# Patient Record
Sex: FEMALE | HISPANIC OR LATINO | Employment: FULL TIME | URBAN - METROPOLITAN AREA
[De-identification: names, ages, dates, MRNs, and addresses within clinical notes are randomized per-mention and may not be internally consistent; named-entity substitution may affect disease eponyms.]

---

## 2024-08-30 ENCOUNTER — OFFICE VISIT (OUTPATIENT)
Dept: OBSTETRICS AND GYNECOLOGY | Facility: CLINIC | Age: 39
End: 2024-08-30
Payer: OTHER GOVERNMENT

## 2024-08-30 VITALS
HEIGHT: 65 IN | WEIGHT: 182 LBS | DIASTOLIC BLOOD PRESSURE: 80 MMHG | SYSTOLIC BLOOD PRESSURE: 124 MMHG | BODY MASS INDEX: 30.32 KG/M2

## 2024-08-30 DIAGNOSIS — Z13.89 SCREENING FOR GENITOURINARY CONDITION: Primary | ICD-10-CM

## 2024-08-30 DIAGNOSIS — R10.2 PELVIC PAIN: ICD-10-CM

## 2024-08-30 LAB
B-HCG UR QL: NEGATIVE
BILIRUB BLD-MCNC: NEGATIVE MG/DL
CLARITY, POC: CLEAR
COLOR UR: YELLOW
EXPIRATION DATE: NORMAL
GLUCOSE UR STRIP-MCNC: NEGATIVE MG/DL
INTERNAL NEGATIVE CONTROL: NORMAL
INTERNAL POSITIVE CONTROL: NORMAL
KETONES UR QL: NEGATIVE
LEUKOCYTE EST, POC: NEGATIVE
Lab: NORMAL
NITRITE UR-MCNC: NEGATIVE MG/ML
PH UR: 5 [PH] (ref 5–8)
PROT UR STRIP-MCNC: NEGATIVE MG/DL
RBC # UR STRIP: NEGATIVE /UL
SP GR UR: 1 (ref 1–1.03)
UROBILINOGEN UR QL: NORMAL

## 2024-08-30 RX ORDER — NORETHINDRONE ACETATE 5 MG
TABLET ORAL
COMMUNITY
Start: 2024-07-16

## 2024-08-30 RX ORDER — LEVOTHYROXINE SODIUM 50 UG/1
50 TABLET ORAL DAILY
COMMUNITY

## 2024-08-30 RX ORDER — VALACYCLOVIR HYDROCHLORIDE 500 MG/1
TABLET, FILM COATED ORAL
COMMUNITY
Start: 2024-07-16

## 2024-08-30 RX ORDER — LISDEXAMFETAMINE DIMESYLATE 50 MG/1
CAPSULE ORAL
COMMUNITY
Start: 2024-08-16

## 2024-08-30 RX ORDER — PROPRANOLOL HYDROCHLORIDE 10 MG/1
TABLET ORAL
COMMUNITY
Start: 2024-06-11

## 2024-08-30 NOTE — PROGRESS NOTES
"Subjective     Chief Complaint   Patient presents with    Pelvic Pain       Daiana Carrizales is a 39 y.o. No obstetric history on file. whose LMP is Patient's last menstrual period was 08/22/2024.. She is new to the area. 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     She has Mirena IUD. This IUD was placed 11/22 and this is her 3rd IUD. She is concerned this IUD may be different than the Mirena because of conversation she and the prior MD had plus her experience with this one is so much different than the two before. Since this IUD was placed she has cramping that is severe. She reports the pain is so severe that she has been to the ER x 2. Reports the pain feels like it is stabbing. She has had a CT scan and US- reports normal in [place]. The pain occurs the week before, the week of, and the week after her period. Reports the pain is sporadic. Ibuprofen helps. Today she mild pain on her (R) side. She has stopped taking the norenthindrone she was prescribed bc it was not hleping with her pain.         HPI    HPI    The following portions of the patient's history were reviewed and updated as appropriate:vital signs, allergies, current medications, past medical history, past social history, past surgical history, and problem list      Review of Systems     Review of Systems   Constitutional: Negative.    Genitourinary:  Positive for pelvic pain.   Musculoskeletal:  Positive for back pain.       Objective      /80   Ht 165.1 cm (65\")   Wt 82.6 kg (182 lb)   LMP 08/22/2024   Breastfeeding No   BMI 30.29 kg/m²     Physical Exam    Physical Exam  Vitals and nursing note reviewed.   Constitutional:       Appearance: Normal appearance.   Abdominal:      General: There is no distension.      Palpations: Abdomen is soft. There is no mass.      Tenderness: There is abdominal tenderness.   Skin:     General: Skin is warm and dry.   Neurological:      General: No focal deficit " present.      Mental Status: She is alert and oriented to person, place, and time.   Psychiatric:         Mood and Affect: Mood normal.         Behavior: Behavior normal.         Lab Review   Labs: Urine pregnancy test, Urinalysis - with micro     Imaging   No data reviewed    Assessment  Diagnoses and all orders for this visit:    1. Screening for genitourinary condition (Primary)  -     POC Urinalysis Dipstick  -     POC Pregnancy, Urine    2. Pelvic pain        Assessment/Plan:   Pelvic pain- Need records from previous provider. Unable to see IUD strings on exam today. Pt thinks she has a Mirena but has some concerns it could be a Kylena. Plan to replace Mirena. If her pain continues despite IUD replacement, may need further imaging or dx lap.   IUD strings lost- Unable to locate IUD strings. Check TVUS. Rec removal and replacement of IUD under US visualization to ensure correct location.     RTO FOR TVUS and IUD replacement.     Rosita Torres, APRN  9/4/2024

## 2024-09-04 ENCOUNTER — TELEPHONE (OUTPATIENT)
Dept: OBSTETRICS AND GYNECOLOGY | Facility: CLINIC | Age: 39
End: 2024-09-04
Payer: OTHER GOVERNMENT

## 2024-09-04 NOTE — TELEPHONE ENCOUNTER
Check benefits of Mirena. Pt currently has IUD, thinks Mirena but is having issues with. She desires a new IUD to be placed.

## 2024-09-13 ENCOUNTER — TELEPHONE (OUTPATIENT)
Dept: OBSTETRICS AND GYNECOLOGY | Facility: CLINIC | Age: 39
End: 2024-09-13

## 2024-09-13 NOTE — TELEPHONE ENCOUNTER
Called Pt, we do not have benefits approved for Mirena at this time 09/13/24.     Pt was scheduled today for IUD replacement under us guided.     Appt had to be cancelled.     We need to call pt Monday and get the patient R/S for appt + Benefits Verified.     It is Ok to speak to the  and schedule. Pt can not have her phone during work hours.

## 2024-09-20 ENCOUNTER — OFFICE VISIT (OUTPATIENT)
Dept: OBSTETRICS AND GYNECOLOGY | Facility: CLINIC | Age: 39
End: 2024-09-20
Payer: OTHER GOVERNMENT

## 2024-09-20 VITALS
DIASTOLIC BLOOD PRESSURE: 72 MMHG | SYSTOLIC BLOOD PRESSURE: 114 MMHG | BODY MASS INDEX: 30.16 KG/M2 | HEIGHT: 65 IN | WEIGHT: 181 LBS

## 2024-09-20 DIAGNOSIS — R82.90 ABNORMAL URINALYSIS: ICD-10-CM

## 2024-09-20 DIAGNOSIS — Z13.89 SCREENING FOR GENITOURINARY CONDITION: ICD-10-CM

## 2024-09-20 DIAGNOSIS — Z30.430 ENCOUNTER FOR IUD INSERTION: Primary | ICD-10-CM

## 2024-09-20 LAB
B-HCG UR QL: NEGATIVE
BILIRUB BLD-MCNC: NEGATIVE MG/DL
CLARITY, POC: ABNORMAL
COLOR UR: ABNORMAL
EXPIRATION DATE: NORMAL
GLUCOSE UR STRIP-MCNC: NEGATIVE MG/DL
INTERNAL NEGATIVE CONTROL: NORMAL
INTERNAL POSITIVE CONTROL: NORMAL
KETONES UR QL: NEGATIVE
LEUKOCYTE EST, POC: ABNORMAL
Lab: NORMAL
NITRITE UR-MCNC: POSITIVE MG/ML
PH UR: 7 [PH] (ref 5–8)
PROT UR STRIP-MCNC: ABNORMAL MG/DL
RBC # UR STRIP: ABNORMAL /UL
SP GR UR: 1.02 (ref 1–1.03)
UROBILINOGEN UR QL: NORMAL

## 2024-09-20 RX ORDER — NITROFURANTOIN 25; 75 MG/1; MG/1
100 CAPSULE ORAL 2 TIMES DAILY
Qty: 14 CAPSULE | Refills: 0 | Status: SHIPPED | OUTPATIENT
Start: 2024-09-20 | End: 2024-09-27

## 2024-09-20 NOTE — PROGRESS NOTES
S: Pt here for IUD removal and reinsertion.     Vitals:    09/20/24 1220   BP: 114/72     O: Review of Systems - Negative except pelvic pain at times    A: Physical Exam  Vitals and nursing note reviewed.   Constitutional:       Appearance: Normal appearance.   Genitourinary:     Labia:         Right: No rash, tenderness or lesion.         Left: No rash, tenderness or lesion.       Vagina: No signs of injury and foreign body. No vaginal discharge, erythema, tenderness or bleeding.      Cervix: Normal.      Uterus: Normal.       Adnexa: Right adnexa normal and left adnexa normal.   Musculoskeletal:         General: Normal range of motion.   Skin:     General: Skin is warm and dry.   Neurological:      General: No focal deficit present.      Mental Status: She is alert and oriented to person, place, and time.   Psychiatric:         Mood and Affect: Mood normal.         Behavior: Behavior normal.               Type of IUD:  Mirena  Date of insertion:  Uncertain  Reason for removal:  Pelvic pain   Other relevant history/information:  none    Procedure Time Out Documentation      Procedure Details  IUD strings visible:  no  Local anesthesia:  None  Tenaculum used:  Yes, single tooth  Removal:   Alligator forceps attempted x 3. Dr. Glynn came into exam room and used IUD hook with permission of the patient    All appropriate instructions regarding removal were reviewed.    Tolerated well  No apparent complications  Post procedure diagnosis : IUD removal     Plans for contraception:  IUD    Other follow-up needed:  none    The patient was advised to call for any fever or for prolonged or severe pain or bleeding. She was advised to use NSAID as needed for mild to moderate pain.     Procedure: Intrauterine device insertion     LNMP: now   Pregnancy Test: neg  Current Contraception Method: IUD      Chief Complaint: IUD insertion.  Patient presents for IUD insertion.  She appears stable today and agrees to proceed with IUD  placement.      Pre procedure indication 1) Desires Mirena  Post procedure indication 1) Desires Mirena    The risks, benefits, and alternatives to IUD were explained at length with the patient. All her questions were answered and consents were signed.    The patient was placed in a dorsal lithotomy position on the examining table in [facility]. A bimanual exam confirmed the uterus was normal in size, AV.  A warmed metal speculum was inserted into the vagina and the cervix was brought into view.    The cervix was prepped with Betadine. A tenaculum was used to grasp the cervix. The endometrial cavity was then sounded to 9 cm without use of a dilator.    The  was then carefully advanced to the cervical canal into the uterus to the level of the fundus. This was then backed off about 1.5-2 cm to allow sufficient space for the arms to open. The device was deployed. The  was removed carefully from the uterus. The threads were then cut leaving 2-3 cm visible outside of the cervix.   Good hemostasis was noted.     All other instruments were removed from the vagina. There were no complications.  The patient tolerated the procedure well with a minimal amount of discomfort.    The patient was counseled about the need to return in 4 weeks for string check.     She was counseled about the need to use a backup method of contraception such as condoms for 1-2 weeks. The patient is counseled to contact us if she has any significant or increasing bleeding, pain, fever, chills, or other concerns. She is instructed to see a doctor right away if she believes that she may be pregnant at any time with the IUD in place.      Plan: 1) Pelvic pain- IUD removal and insertion. Will try the new IUD to see if this resolves any c/o.   2) UTI- Noted on UA today. Check urine cx. ERX macrobid    RTO PRN and 4 weeks IUD check     PEGGY De La Garza  9/20/2024  13:05 EDT

## 2024-09-25 LAB
BACTERIA UR CULT: ABNORMAL
BACTERIA UR CULT: ABNORMAL
OTHER ANTIBIOTIC SUSC ISLT: ABNORMAL

## 2024-10-01 PROBLEM — R82.90 ABNORMAL URINALYSIS: Status: ACTIVE | Noted: 2024-10-01

## 2024-10-25 ENCOUNTER — OFFICE VISIT (OUTPATIENT)
Dept: OBSTETRICS AND GYNECOLOGY | Facility: CLINIC | Age: 39
End: 2024-10-25
Payer: OTHER GOVERNMENT

## 2024-10-25 VITALS
DIASTOLIC BLOOD PRESSURE: 70 MMHG | HEIGHT: 65 IN | WEIGHT: 183 LBS | BODY MASS INDEX: 30.49 KG/M2 | SYSTOLIC BLOOD PRESSURE: 110 MMHG

## 2024-10-25 DIAGNOSIS — Z13.89 SCREENING FOR GENITOURINARY CONDITION: ICD-10-CM

## 2024-10-25 DIAGNOSIS — Z30.431 IUD CHECK UP: Primary | ICD-10-CM

## 2024-10-25 DIAGNOSIS — Z76.89 ENCOUNTER TO ESTABLISH CARE: ICD-10-CM

## 2024-10-25 RX ORDER — PROPRANOLOL HYDROCHLORIDE 10 MG/1
20 TABLET ORAL NIGHTLY
Qty: 60 TABLET | Refills: 3 | Status: SHIPPED | OUTPATIENT
Start: 2024-10-25

## 2024-10-25 RX ORDER — LEVOTHYROXINE SODIUM 50 UG/1
50 TABLET ORAL DAILY
Qty: 30 TABLET | Refills: 3 | Status: SHIPPED | OUTPATIENT
Start: 2024-10-25

## 2024-10-25 NOTE — PROGRESS NOTES
S: Patient present today for her IUD string check. She has the Mirena IUD and it was placed on 10/1/24. She reports only cramping since placement. The sharp pain she was experiencing prior to this IUD being placed has resolved. She has not tried to feel her IUD strings. She is happy with the IUD.  She is asking for a refill of her propranolol and levothyroxine until she is established with a PCP since just moving here     O: UPT: neg   Physical Exam  Vitals and nursing note reviewed.   Constitutional:       Appearance: Normal appearance.   Genitourinary:     Labia:         Right: No rash, tenderness or lesion.         Left: No rash, tenderness or lesion.       Vagina: No signs of injury and foreign body. No vaginal discharge, erythema, tenderness or bleeding.      Cervix: No cervical motion tenderness, discharge, friability, lesion, erythema or cervical bleeding.      Comments: IUD strings noted, extremely short in cervical os   Musculoskeletal:         General: Normal range of motion.   Skin:     General: Skin is warm and dry.   Neurological:      General: No focal deficit present.      Mental Status: She is alert and oriented to person, place, and time.   Psychiatric:         Mood and Affect: Mood normal.         Behavior: Behavior normal.         A: IUD check   Med refills    P: 1) Rev IUD string checks and when to perform. Instructed to call office with any concerns.   2) Med refills- Ref placed for PCP. Refills sent on medications. She understands this is a courtesy for her and will need to be managed by PCP once that care has been established.     RTO PRN and for NOEL Torres, PEGGY  10/25/2024  12:08 EDT

## 2024-10-29 PROBLEM — Z76.89 ENCOUNTER TO ESTABLISH CARE: Status: ACTIVE | Noted: 2024-10-29

## 2024-10-29 PROBLEM — Z30.431 IUD CHECK UP: Status: ACTIVE | Noted: 2024-10-29

## 2024-11-08 ENCOUNTER — OFFICE VISIT (OUTPATIENT)
Dept: FAMILY MEDICINE CLINIC | Facility: CLINIC | Age: 39
End: 2024-11-08
Payer: OTHER GOVERNMENT

## 2024-11-08 VITALS
SYSTOLIC BLOOD PRESSURE: 122 MMHG | OXYGEN SATURATION: 98 % | HEART RATE: 69 BPM | HEIGHT: 65 IN | DIASTOLIC BLOOD PRESSURE: 70 MMHG | TEMPERATURE: 96.7 F | RESPIRATION RATE: 14 BRPM | WEIGHT: 188.5 LBS | BODY MASS INDEX: 31.4 KG/M2

## 2024-11-08 DIAGNOSIS — E06.3 HYPOTHYROIDISM DUE TO HASHIMOTO THYROIDITIS: Primary | ICD-10-CM

## 2024-11-08 DIAGNOSIS — R00.2 PALPITATIONS: ICD-10-CM

## 2024-11-08 DIAGNOSIS — Z76.89 ENCOUNTER TO ESTABLISH CARE: ICD-10-CM

## 2024-11-08 DIAGNOSIS — F90.9 ATTENTION DEFICIT HYPERACTIVITY DISORDER (ADHD), UNSPECIFIED ADHD TYPE: ICD-10-CM

## 2024-11-08 RX ORDER — LISDEXAMFETAMINE DIMESYLATE 50 MG/1
50 CAPSULE ORAL EVERY MORNING
Qty: 90 CAPSULE | Refills: 0 | Status: SHIPPED | OUTPATIENT
Start: 2024-11-08

## 2024-11-08 NOTE — PROGRESS NOTES
"Chief Complaint  Establish Care (Discuss cardiology referall had zio patch in past ), Hashimoto's Thyroiditis, Palpitations, and ADHD    Subjective        Daiana Carrizales presents to [facility]  History of Present Illness    39F  with a PMH of hypothyroidism, ADHD, palpitations,     She is established with OBGYN. She reports mammogram completed within the last year.           Objective   Vital Signs:  /70   Pulse 69   Temp 96.7 °F (35.9 °C) (Temporal)   Resp 14   Ht 165.1 cm (65\")   Wt 85.5 kg (188 lb 8 oz)   SpO2 98%   BMI 31.37 kg/m²   Estimated body mass index is 31.37 kg/m² as calculated from the following:    Height as of this encounter: 165.1 cm (65\").    Weight as of this encounter: 85.5 kg (188 lb 8 oz).          Physical Exam   Result Review :                Assessment and Plan   Diagnoses and all orders for this visit:    1. Hypothyroidism due to Hashimoto thyroiditis (Primary)    2. Attention deficit hyperactivity disorder (ADHD), unspecified ADHD type    3. Palpitations    4. Encounter to establish care    39-year-old female here to establish care history of ADHD and Hashimoto thyroidism, we will obtain blood work before next physical, she has a history of palpitations in which we will obtain a baseline EKG to evaluate for QTc prolongation however she reports most of her symptoms are largely at night           Follow Up   No follow-ups on file.  Patient was given instructions and counseling regarding her condition or for health maintenance advice. Please see specific information pulled into the AVS if appropriate.             "

## 2024-11-09 LAB
ALBUMIN SERPL-MCNC: 4.3 G/DL (ref 3.9–4.9)
ALBUMIN/CREAT UR: 5 MG/G CREAT (ref 0–29)
ALP SERPL-CCNC: 70 IU/L (ref 44–121)
ALT SERPL-CCNC: 8 IU/L (ref 0–32)
AST SERPL-CCNC: 13 IU/L (ref 0–40)
BASOPHILS # BLD AUTO: 0 X10E3/UL (ref 0–0.2)
BASOPHILS NFR BLD AUTO: 1 %
BILIRUB SERPL-MCNC: 0.5 MG/DL (ref 0–1.2)
BUN SERPL-MCNC: 11 MG/DL (ref 6–24)
BUN/CREAT SERPL: 14 (ref 9–23)
CALCIUM SERPL-MCNC: 9 MG/DL (ref 8.7–10.2)
CHLORIDE SERPL-SCNC: 104 MMOL/L (ref 96–106)
CHOLEST SERPL-MCNC: 245 MG/DL (ref 100–199)
CO2 SERPL-SCNC: 23 MMOL/L (ref 20–29)
CREAT SERPL-MCNC: 0.78 MG/DL (ref 0.57–1)
CREAT UR-MCNC: 70.6 MG/DL
EGFRCR SERPLBLD CKD-EPI 2021: 98 ML/MIN/1.73
EOSINOPHIL # BLD AUTO: 0.1 X10E3/UL (ref 0–0.4)
EOSINOPHIL NFR BLD AUTO: 2 %
ERYTHROCYTE [DISTWIDTH] IN BLOOD BY AUTOMATED COUNT: 11.8 % (ref 11.7–15.4)
GLOBULIN SER CALC-MCNC: 2.6 G/DL (ref 1.5–4.5)
GLUCOSE SERPL-MCNC: 91 MG/DL (ref 70–99)
HBA1C MFR BLD: 5.2 % (ref 4.8–5.6)
HCT VFR BLD AUTO: 44.2 % (ref 34–46.6)
HDLC SERPL-MCNC: 59 MG/DL
HGB BLD-MCNC: 14.7 G/DL (ref 11.1–15.9)
IMM GRANULOCYTES # BLD AUTO: 0 X10E3/UL (ref 0–0.1)
IMM GRANULOCYTES NFR BLD AUTO: 0 %
LDLC SERPL CALC-MCNC: 166 MG/DL (ref 0–99)
LYMPHOCYTES # BLD AUTO: 1.7 X10E3/UL (ref 0.7–3.1)
LYMPHOCYTES NFR BLD AUTO: 27 %
MCH RBC QN AUTO: 31.2 PG (ref 26.6–33)
MCHC RBC AUTO-ENTMCNC: 33.3 G/DL (ref 31.5–35.7)
MCV RBC AUTO: 94 FL (ref 79–97)
MICROALBUMIN UR-MCNC: 3.5 UG/ML
MONOCYTES # BLD AUTO: 0.4 X10E3/UL (ref 0.1–0.9)
MONOCYTES NFR BLD AUTO: 7 %
NEUTROPHILS # BLD AUTO: 3.9 X10E3/UL (ref 1.4–7)
NEUTROPHILS NFR BLD AUTO: 63 %
PLATELET # BLD AUTO: 312 X10E3/UL (ref 150–450)
POTASSIUM SERPL-SCNC: 5.1 MMOL/L (ref 3.5–5.2)
PROT SERPL-MCNC: 6.9 G/DL (ref 6–8.5)
RBC # BLD AUTO: 4.71 X10E6/UL (ref 3.77–5.28)
SODIUM SERPL-SCNC: 137 MMOL/L (ref 134–144)
TRIGL SERPL-MCNC: 114 MG/DL (ref 0–149)
VLDLC SERPL CALC-MCNC: 20 MG/DL (ref 5–40)
WBC # BLD AUTO: 6.2 X10E3/UL (ref 3.4–10.8)

## 2024-11-23 RX ORDER — LEVOTHYROXINE SODIUM 50 UG/1
50 TABLET ORAL DAILY
Qty: 30 TABLET | Refills: 3 | Status: CANCELLED | OUTPATIENT
Start: 2024-11-23

## 2024-11-25 DIAGNOSIS — F90.9 ATTENTION DEFICIT HYPERACTIVITY DISORDER (ADHD), UNSPECIFIED ADHD TYPE: Primary | ICD-10-CM

## 2024-11-25 DIAGNOSIS — Z79.899 HIGH RISK MEDICATIONS (NOT ANTICOAGULANTS) LONG-TERM USE: ICD-10-CM

## 2024-11-25 DIAGNOSIS — F90.9 ATTENTION DEFICIT HYPERACTIVITY DISORDER (ADHD), UNSPECIFIED ADHD TYPE: ICD-10-CM

## 2024-11-25 RX ORDER — LEVOTHYROXINE SODIUM 50 UG/1
50 TABLET ORAL DAILY
Qty: 30 TABLET | Refills: 3 | Status: SHIPPED | OUTPATIENT
Start: 2024-11-25

## 2024-11-25 RX ORDER — LISDEXAMFETAMINE DIMESYLATE 50 MG/1
50 CAPSULE ORAL EVERY MORNING
Qty: 90 CAPSULE | Refills: 0 | Status: CANCELLED | OUTPATIENT
Start: 2024-11-25

## 2024-12-03 DIAGNOSIS — F90.9 ATTENTION DEFICIT HYPERACTIVITY DISORDER (ADHD), UNSPECIFIED ADHD TYPE: ICD-10-CM

## 2024-12-04 ENCOUNTER — DOCUMENTATION (OUTPATIENT)
Dept: FAMILY MEDICINE CLINIC | Facility: CLINIC | Age: 39
End: 2024-12-04
Payer: OTHER GOVERNMENT

## 2024-12-04 LAB
1OH-MIDAZOLAM UR QL SCN: NOT DETECTED NG/MG CREAT
6MAM UR QL SCN: NEGATIVE NG/ML
7AMINOCLONAZEPAM/CREAT UR: NOT DETECTED NG/MG CREAT
A-OH ALPRAZ/CREAT UR: NOT DETECTED NG/MG CREAT
A-OH-TRIAZOLAM/CREAT UR CFM: NOT DETECTED NG/MG CREAT
ALPRAZ/CREAT UR CFM: NOT DETECTED NG/MG CREAT
AMPHETAMINES UR QL SCN: NEGATIVE NG/ML
BARBITURATES UR QL SCN: NEGATIVE NG/ML
BENZODIAZ SCN METH UR: NEGATIVE
BUPRENORPHINE UR QL SCN: NEGATIVE
BUPRENORPHINE/CREAT UR: NOT DETECTED NG/MG CREAT
CLONAZEPAM/CREAT UR CFM: NOT DETECTED NG/MG CREAT
COCAINE+BZE UR QL SCN: NEGATIVE NG/ML
CREAT UR-MCNC: 43 MG/DL
DESALKYLFLURAZ/CREAT UR: NOT DETECTED NG/MG CREAT
DIAZEPAM/CREAT UR: NOT DETECTED NG/MG CREAT
FENTANYL CTO UR SCN-MCNC: NEGATIVE NG/ML
FENTANYL/CREAT UR: NOT DETECTED NG/MG CREAT
FLUNITRAZEPAM UR QL SCN: NOT DETECTED NG/MG CREAT
LORAZEPAM/CREAT UR: NOT DETECTED NG/MG CREAT
METHADONE UR QL SCN: NEGATIVE NG/ML
METHADONE+METAB UR QL SCN: NEGATIVE NG/ML
MIDAZOLAM/CREAT UR CFM: NOT DETECTED NG/MG CREAT
NORBUPRENORPHINE/CREAT UR: NOT DETECTED NG/MG CREAT
NORDIAZEPAM/CREAT UR: NOT DETECTED NG/MG CREAT
NORFENTANYL/CREAT UR: NOT DETECTED NG/MG CREAT
NORFLUNITRAZEPAM UR-MCNC: NOT DETECTED NG/MG CREAT
OPIATES UR SCN-MCNC: NEGATIVE NG/ML
OXAZEPAM/CREAT UR: NOT DETECTED NG/MG CREAT
OXYCODONE CTO UR SCN-MCNC: NEGATIVE NG/ML
PCP UR QL SCN: NEGATIVE NG/ML
PRESCRIBED MEDICATIONS: NORMAL
TAPENTADOL CTO UR SCN-MCNC: NEGATIVE NG/ML
TEMAZEPAM/CREAT UR: NOT DETECTED NG/MG CREAT
TRAMADOL UR QL SCN: NEGATIVE NG/ML

## 2024-12-04 RX ORDER — LISDEXAMFETAMINE DIMESYLATE 50 MG/1
50 CAPSULE ORAL EVERY MORNING
Qty: 30 CAPSULE | Refills: 0 | Status: SHIPPED | OUTPATIENT
Start: 2024-12-04

## 2024-12-06 ENCOUNTER — OFFICE VISIT (OUTPATIENT)
Dept: FAMILY MEDICINE CLINIC | Facility: CLINIC | Age: 39
End: 2024-12-06
Payer: OTHER GOVERNMENT

## 2024-12-06 VITALS
BODY MASS INDEX: 33.05 KG/M2 | HEART RATE: 74 BPM | WEIGHT: 198.4 LBS | TEMPERATURE: 96.7 F | HEIGHT: 65 IN | DIASTOLIC BLOOD PRESSURE: 80 MMHG | OXYGEN SATURATION: 96 % | SYSTOLIC BLOOD PRESSURE: 118 MMHG | RESPIRATION RATE: 16 BRPM

## 2024-12-06 DIAGNOSIS — E06.3 HYPOTHYROIDISM DUE TO HASHIMOTO THYROIDITIS: Primary | ICD-10-CM

## 2024-12-06 DIAGNOSIS — Z80.3 FAMILY HISTORY OF BREAST CANCER: ICD-10-CM

## 2024-12-06 DIAGNOSIS — J02.9 SORE THROAT: ICD-10-CM

## 2024-12-06 DIAGNOSIS — Z71.3 WEIGHT LOSS COUNSELING, ENCOUNTER FOR: ICD-10-CM

## 2024-12-06 DIAGNOSIS — E78.00 HYPERCHOLESTEROLEMIA: ICD-10-CM

## 2024-12-06 LAB
EXPIRATION DATE: NORMAL
FLUAV AG NPH QL: NEGATIVE
FLUBV AG NPH QL: NEGATIVE
INTERNAL CONTROL: NORMAL
Lab: NORMAL
S PYO AG THROAT QL: NEGATIVE
SARS-COV-2 AG UPPER RESP QL IA.RAPID: NOT DETECTED

## 2024-12-06 PROCEDURE — 87880 STREP A ASSAY W/OPTIC: CPT

## 2024-12-06 PROCEDURE — 87426 SARSCOV CORONAVIRUS AG IA: CPT

## 2024-12-06 PROCEDURE — 99396 PREV VISIT EST AGE 40-64: CPT

## 2024-12-06 PROCEDURE — 87804 INFLUENZA ASSAY W/OPTIC: CPT

## 2024-12-06 RX ORDER — ROSUVASTATIN CALCIUM 10 MG/1
10 TABLET, COATED ORAL DAILY
Qty: 90 TABLET | Refills: 2 | Status: SHIPPED | OUTPATIENT
Start: 2024-12-06

## 2024-12-06 NOTE — PROGRESS NOTES
Chief Complaint  Annual checkup    HISTORY    Daiana Carrizales is a 39 y.o. female who presents to the office today as a  a new patient for their annual preventative exam.     39F  with a PMH of hypothyroidism, ADHD, palpitations, pure hypercholesterolemia with a notably elevated LDL at 166.  She has a Mirena IUD.     She is established with OBGYN. She reports mammogram completed within the last year.    She additionally reports with sore throat, cough, headache, malaise, fever, which ahs been improving.     No hospitalization(s) within the last year.     GYN History:     *[facility]     *regular periods every 29days    *Mirena    *Previous pregnancies: 2.  Live births: 2.  Miscarriages: 0 . Elective abortions: 0.      Health Maintenance Summary            Overdue - BMI FOLLOWUP (Yearly) Never done      No completion history exists for this topic.              Overdue - MAMMOGRAM (Every 2 Years) Never done      No completion history exists for this topic.              Overdue - ANNUAL PHYSICAL (Yearly) Never done      No completion history exists for this topic.              Overdue - PAP SMEAR (Every 3 Years) Never done      No completion history exists for this topic.              Overdue - COVID-19 Vaccine ( - - season) Never done      No completion history exists for this topic.              Postponed - TDAP/TD VACCINES (1 - Tdap) Postponed until 2033      No completion history exists for this topic.              INFLUENZA VACCINE  Completed      09/10/2024  Imm Admin: Fluzone  >6mos              HEPATITIS C SCREENING  Completed      2024  Done              Pneumococcal Vaccine 0-64 (Series Information) Aged Out      No completion history exists for this topic.                     No Known Allergies     No outpatient medications have been marked as taking for the 24 encounter (Appointment) with Pancho Rodriguez DO.        Past Medical History:    Diagnosis Date    ADHD (attention deficit hyperactivity disorder)     Hashimoto's thyroiditis     Herpes genitalis in women     Palpitations      Past Surgical History:   Procedure Laterality Date    VAGINAL DELIVERY      x2     Family History   Problem Relation Age of Onset    Mental illness Mother     Adrenal disorder Mother     Depression Sister     Diabetes Maternal Grandmother     reports that she has never smoked. She has never been exposed to tobacco smoke. She has never used smokeless tobacco. She reports current alcohol use. She reports that she does not use drugs.    Immunization History   Administered Date(s) Administered    Fluzone  >6mos 09/10/2024        OBJECTIVE    Vital Signs:   There were no vitals taken for this visit.    Physical Exam  Constitutional:       Appearance: Normal appearance. She is not ill-appearing, toxic-appearing or diaphoretic.   HENT:      Head: Normocephalic. No raccoon eyes, contusion, masses or laceration.      Nose: Nose normal.   Eyes:      General: No scleral icterus.        Right eye: No discharge.         Left eye: No discharge.      Extraocular Movements: Extraocular movements intact.      Pupils: Pupils are equal, round, and reactive to light.   Neck:      Thyroid: No thyromegaly.      Vascular: No JVD.   Cardiovascular:      Rate and Rhythm: Normal rate and regular rhythm.      Pulses: Normal pulses.   Pulmonary:      Effort: No accessory muscle usage or respiratory distress.      Breath sounds: Normal breath sounds. No stridor. No wheezing, rhonchi or rales.   Chest:      Chest wall: No tenderness.   Abdominal:      General: There is no distension.      Palpations: Abdomen is soft. There is no fluid wave or pulsatile mass.      Tenderness: There is no abdominal tenderness. There is no guarding.   Musculoskeletal:         General: No swelling, tenderness or deformity.      Cervical back: Neck supple. No rigidity.   Skin:     General: Skin is warm and dry.       Coloration: Skin is not jaundiced.   Neurological:      General: No focal deficit present.      Mental Status: She is alert and oriented to person, place, and time.   Psychiatric:         Mood and Affect: Mood normal.         Thought Content: Thought content normal.         Judgment: Judgment normal.                       The 10-year ASCVD risk score (Orlin PIERRE, et al., 2019) is: 0.7%    Values used to calculate the score:      Age: 39 years      Sex: Female      Is Non- : No      Diabetic: No      Tobacco smoker: No      Systolic Blood Pressure: 122 mmHg      Is BP treated: No      HDL Cholesterol: 59 mg/dL      Total Cholesterol: 245 mg/dL     ASSESSMENT & PLAN     #Annual Preventative Health Examination   -Age and sex appropriate physical exam performed and documented. Updated past medical, family, social and surgical histories as well as allergies and care team list. Addressed care gaps listed in the medical record.  -Encouraged annual dental and vision exams as part of their overall health.  -Encouraged minimum of 30 minutes or more of exercise at a brisk walk or higher 5 days per week combined with a well-balanced diet.  -Advised that all women who are planning or capable of pregnancy take a daily supplement containing 0.4 to 0.8 mg (400 to 800 ?g) of folic acid.  -Immunizations reviewed and updated in EMR. COVID19 recommended.  -Lipid screening:   Lipid Panel          11/8/2024    09:36   Lipid Panel   Total Cholesterol 245    Triglycerides 114    HDL Cholesterol 59    VLDL Cholesterol 20    LDL Cholesterol  166     Patient is over age 40 and a 10-year ASCVD risk was calculated which does indicate a need for statin therapy. See plan below. .   -Aspirin for primary or secondary prevention: Not applicable, patient is less than age 50.  -Depression and Anxiety screening: Patient denies symptom of anxiety or depression.  -Diabetes screening:  Screening not indicated at this time.    -Tobacco use screening: Conducted and addressed if indicated.   -Alcohol use screening: Conducted and addressed if indicated.   -Illicit drug screening: Conducted and addressed if indicated.   -Hypertension screening: Patient screened negative for HTN today.  -HIV screening: completed   -Syphilis screening: Syphilis screening not indicated.  -Hepatitis B virus screening: Screening not indicated, not in a high-risk group.  -Hepatitis C virus screening:  Patient has already completed Hepatitis C screening. Negative screening on file.   -Colon cancer screening: Patient is less than age 45 and colon cancer screening is not indicated.  -Lung cancer screening: Patient has never smoked.  -Cervical cancer screening:    -Breast cancer screening:  She reports her last mammogram was normal at outside facility, will attempt to obtain records.  -BRCA related cancer screening:  Patient does have a known personal or family history.   -Osteoporosis screening: Informed patient that the USPSTF recommends screening for osteoporosis with bone measurement testing to prevent osteoporotic fractures in women 65 years and older. Screening is not applicable at this time.    Recommend Covid immunization.     Follow up in 1 year for annual physical exam.    Patient/family had no further questions at this time and verbalized understanding of the plan discussed today.   Flu and COVID were obtained at this visit were both negative  Because of her family breast cancer history and her aunt we will order breast cancer blood screening.  We discussed her hyperlipidemia and will start Crestor at 10 mg daily.  Repeat lipid panel with apolipoprotein B and lipoprotein a prior to next visit she would also like to start Zepbound for weight management which was prescribed at this visit  Avoid stimulant weight loss medications given her history of palpitations.   -Patient seen today for initiation of pharmacological weight loss therapy. Patient advised of  the alternatives to pharmacological weight loss therapy, including strict dieting and exercise, weight loss programs such as Weightwatchers, and mobile applications such as [employer]. Advised patient that weight loss medication is most successful when in conjunction with diet and exercise.  -After discussing the alternatives, patient is interested in starting Zepbound.  -Reviewed contraindications for Zepbound: Patients with a personal or family history or MTC or patients with MEN 2 or pregnancy  -Discussed the most common side effects , including nausea, diarrhea, constipation, vomiting, injection site reactions, headache, indigestion, fatigue, belching, hair loss, heartburn, minor abdominal pain   -Advised patient to report severe abdominal pain as Zepbound can cause pancreatitis or gallbladder disease.   -Counseled patient regarding the potential risk of MTC with use of Zepbound and informed them of symptoms of thyroid tumors (eg, a mass in the neck, dysphagia, dyspnea, persistent hoarseness). Report these if present.   -Counseled patient on strategies to limit nausea with Zepbound:    *Eat bland, low-fat foods, like crackers, toast, and rice   *Eat foods that contain water, like soups and gelatin   *Avoid lying down after they eat  -Educated patient on proper injection location.   -Will begin Zebound titration. Patient to increase each week as long as side effects are tolerable.   Week 1 through week 4 : 2.5 mg once weekly.  Week 5 through week 8: 5 mg once weekly.  Week 9 through week 12: 7.5 mg once weekly.  Week 13 through week 16: 10 mg once weekly.  Week 17 through week 20 : 12.5 mg once weekly  Week 21 and after: 15 mg once weekly   -Will follow up in 3-4 months for weight check. Plan to discontinue if at least 5% of baseline body weight loss has not been achieved at that time.     Pancho Rodriguez, DO

## 2024-12-13 DIAGNOSIS — F90.2 ATTENTION DEFICIT HYPERACTIVITY DISORDER (ADHD), COMBINED TYPE: Primary | ICD-10-CM

## 2024-12-13 RX ORDER — LISDEXAMFETAMINE DIMESYLATE 50 MG/1
50 CAPSULE ORAL EVERY MORNING
Qty: 30 CAPSULE | Refills: 0 | Status: SHIPPED | OUTPATIENT
Start: 2024-12-13

## 2024-12-27 ENCOUNTER — HOSPITAL ENCOUNTER (EMERGENCY)
Facility: HOSPITAL | Age: 39
Discharge: HOME | End: 2024-12-28
Attending: STUDENT IN AN ORGANIZED HEALTH CARE EDUCATION/TRAINING PROGRAM
Payer: OTHER GOVERNMENT

## 2024-12-27 VITALS
SYSTOLIC BLOOD PRESSURE: 142 MMHG | OXYGEN SATURATION: 100 % | TEMPERATURE: 97.4 F | DIASTOLIC BLOOD PRESSURE: 86 MMHG | HEART RATE: 80 BPM | RESPIRATION RATE: 16 BRPM

## 2024-12-27 DIAGNOSIS — R00.2 PALPITATIONS: Primary | ICD-10-CM

## 2024-12-27 DIAGNOSIS — I49.3 PVC (PREMATURE VENTRICULAR CONTRACTION): ICD-10-CM

## 2024-12-27 LAB
ALBUMIN SERPL-MCNC: 4.2 G/DL (ref 3.5–5.2)
ALBUMIN/GLOB SERPL: 1.4 G/DL
ALP SERPL-CCNC: 78 U/L (ref 39–117)
ALT SERPL W P-5'-P-CCNC: 11 U/L (ref 1–33)
ANION GAP SERPL CALCULATED.3IONS-SCNC: 9.2 MMOL/L (ref 5–15)
AST SERPL-CCNC: 11 U/L (ref 1–32)
BASOPHILS # BLD AUTO: 0.02 10*3/MM3 (ref 0–0.2)
BASOPHILS NFR BLD AUTO: 0.2 % (ref 0–1.5)
BILIRUB SERPL-MCNC: 0.2 MG/DL (ref 0–1.2)
BUN SERPL-MCNC: 18 MG/DL (ref 6–20)
BUN/CREAT SERPL: 25 (ref 7–25)
CALCIUM SPEC-SCNC: 9.3 MG/DL (ref 8.6–10.5)
CHLORIDE SERPL-SCNC: 99 MMOL/L (ref 98–107)
CO2 SERPL-SCNC: 24.8 MMOL/L (ref 22–29)
CREAT SERPL-MCNC: 0.72 MG/DL (ref 0.57–1)
DEPRECATED RDW RBC AUTO: 43 FL (ref 37–54)
EGFRCR SERPLBLD CKD-EPI 2021: 107.9 ML/MIN/1.73
EOSINOPHIL # BLD AUTO: 0.18 10*3/MM3 (ref 0–0.4)
EOSINOPHIL NFR BLD AUTO: 2 % (ref 0.3–6.2)
ERYTHROCYTE [DISTWIDTH] IN BLOOD BY AUTOMATED COUNT: 12.6 % (ref 12.3–15.4)
GLOBULIN UR ELPH-MCNC: 3.1 GM/DL
GLUCOSE SERPL-MCNC: 93 MG/DL (ref 65–99)
HCG SERPL QL: NEGATIVE
HCT VFR BLD AUTO: 41.9 % (ref 34–46.6)
HGB BLD-MCNC: 13.8 G/DL (ref 12–15.9)
HOLD SPECIMEN: NORMAL
IMM GRANULOCYTES # BLD AUTO: 0.02 10*3/MM3 (ref 0–0.05)
IMM GRANULOCYTES NFR BLD AUTO: 0.2 % (ref 0–0.5)
LYMPHOCYTES # BLD AUTO: 3.14 10*3/MM3 (ref 0.7–3.1)
LYMPHOCYTES NFR BLD AUTO: 35.6 % (ref 19.6–45.3)
MCH RBC QN AUTO: 30.3 PG (ref 26.6–33)
MCHC RBC AUTO-ENTMCNC: 32.9 G/DL (ref 31.5–35.7)
MCV RBC AUTO: 91.9 FL (ref 79–97)
MONOCYTES # BLD AUTO: 0.81 10*3/MM3 (ref 0.1–0.9)
MONOCYTES NFR BLD AUTO: 9.2 % (ref 5–12)
NEUTROPHILS NFR BLD AUTO: 4.65 10*3/MM3 (ref 1.7–7)
NEUTROPHILS NFR BLD AUTO: 52.8 % (ref 42.7–76)
PLATELET # BLD AUTO: 362 10*3/MM3 (ref 140–450)
PMV BLD AUTO: 9.6 FL (ref 6–12)
POTASSIUM SERPL-SCNC: 3.7 MMOL/L (ref 3.5–5.2)
PROT SERPL-MCNC: 7.3 G/DL (ref 6–8.5)
RBC # BLD AUTO: 4.56 10*6/MM3 (ref 3.77–5.28)
SODIUM SERPL-SCNC: 133 MMOL/L (ref 136–145)
TROPONIN T SERPL HS-MCNC: <6 NG/L
WBC NRBC COR # BLD AUTO: 8.82 10*3/MM3 (ref 3.4–10.8)
WHOLE BLOOD HOLD SPECIMEN: NORMAL

## 2024-12-27 PROCEDURE — 99283 EMERGENCY DEPT VISIT LOW MDM: CPT

## 2024-12-27 PROCEDURE — 84484 ASSAY OF TROPONIN QUANT: CPT | Performed by: STUDENT IN AN ORGANIZED HEALTH CARE EDUCATION/TRAINING PROGRAM

## 2024-12-27 PROCEDURE — 85025 COMPLETE CBC W/AUTO DIFF WBC: CPT | Performed by: STUDENT IN AN ORGANIZED HEALTH CARE EDUCATION/TRAINING PROGRAM

## 2024-12-27 PROCEDURE — 80053 COMPREHEN METABOLIC PANEL: CPT | Performed by: STUDENT IN AN ORGANIZED HEALTH CARE EDUCATION/TRAINING PROGRAM

## 2024-12-27 PROCEDURE — 93005 ELECTROCARDIOGRAM TRACING: CPT | Performed by: STUDENT IN AN ORGANIZED HEALTH CARE EDUCATION/TRAINING PROGRAM

## 2024-12-27 PROCEDURE — 84703 CHORIONIC GONADOTROPIN ASSAY: CPT | Performed by: STUDENT IN AN ORGANIZED HEALTH CARE EDUCATION/TRAINING PROGRAM

## 2024-12-27 PROCEDURE — 25810000003 SODIUM CHLORIDE 0.9 % SOLUTION: Performed by: STUDENT IN AN ORGANIZED HEALTH CARE EDUCATION/TRAINING PROGRAM

## 2024-12-27 RX ORDER — SODIUM CHLORIDE 0.9 % (FLUSH) 0.9 %
10 SYRINGE (ML) INJECTION AS NEEDED
Status: DISCONTINUED | OUTPATIENT
Start: 2024-12-27 | End: 2024-12-28 | Stop reason: HOSPADM

## 2024-12-27 RX ADMIN — SODIUM CHLORIDE 1000 ML: 9 INJECTION, SOLUTION INTRAVENOUS at 23:07

## 2024-12-28 LAB
GEN 5 1HR TROPONIN T REFLEX: <6 NG/L
QT INTERVAL: 379 MS
QTC INTERVAL: 443 MS
TROPONIN T NUMERIC DELTA: NORMAL

## 2024-12-28 PROCEDURE — 84484 ASSAY OF TROPONIN QUANT: CPT | Performed by: STUDENT IN AN ORGANIZED HEALTH CARE EDUCATION/TRAINING PROGRAM

## 2024-12-28 PROCEDURE — 36415 COLL VENOUS BLD VENIPUNCTURE: CPT

## 2024-12-28 NOTE — FS[facility] PROVIDER NOTE
Subjective   History of Present Illness see MDM     Review of Systems   Constitutional:  Negative for fever.   Respiratory:  Negative for shortness of breath.    Cardiovascular:  Positive for palpitations. Negative for chest pain.   Gastrointestinal:  Negative for abdominal pain, nausea and vomiting.   Genitourinary:  Negative for dysuria.   Skin:  Negative for rash and wound.   Neurological:  Negative for weakness and numbness.       Past Medical History:   Diagnosis Date    ADHD (attention deficit hyperactivity disorder)     Hashimoto's thyroiditis     Herpes genitalis in women     Migraines 01/01/2000    Palpitations        No Known Allergies    Past Surgical History:   Procedure Laterality Date    VAGINAL DELIVERY      x2       Family History   Problem Relation Age of Onset    Mental illness Mother     Adrenal disorder Mother     Depression Sister     Diabetes Maternal Grandmother        Social History     Socioeconomic History    Marital status:    Tobacco Use    Smoking status: Never     Passive exposure: Never    Smokeless tobacco: Never   Vaping Use    Vaping status: Never Used   Substance and Sexual Activity    Alcohol use: Yes     Comment: rare    Drug use: Never    Sexual activity: Yes     Partners: Male           Objective   Physical Exam  Vitals and nursing note reviewed.   Constitutional:       Appearance: Normal appearance.   HENT:      Head: Atraumatic.      Right Ear: External ear normal.      Left Ear: External ear normal.      Nose: Nose normal.      Mouth/Throat:      Pharynx: Oropharynx is clear.   Eyes:      Conjunctiva/sclera: Conjunctivae normal.   Cardiovascular:      Rate and Rhythm: Normal rate.      Pulses: Normal pulses.      Heart sounds: Normal heart sounds.   Pulmonary:      Effort: Pulmonary effort is normal. No respiratory distress.      Breath sounds: Normal breath sounds.      Comments: She has normal heart sound lung sounds.  No respiratory distress.  No crackles or  wheezing.  Abdominal:      General: Abdomen is flat. There is no distension.      Palpations: Abdomen is soft.      Tenderness: There is no abdominal tenderness. There is no right CVA tenderness, left CVA tenderness, guarding or rebound.   Musculoskeletal:         General: No swelling.      Cervical back: Neck supple.      Right lower leg: No edema.      Left lower leg: No edema.   Skin:     General: Skin is warm and dry.   Neurological:      Mental Status: She is alert and oriented to person, place, and time. Mental status is at baseline.   Psychiatric:         Behavior: Behavior normal.         Procedures           ED Course  ED Course as of 12/28/24 0617   Fri Dec 27, 2024   2113 ECG 12 Lead Chest Pain  I reviewed the EKG myself.  The patient has sinus rhythm.  Heart rate is 82.  LA interval 136.  QTc 443.  Occasional PVCs.  Otherwise, there is no abnormal ST elevation, depressions or T wave inversion. [DL]   2328 HCG Qualitative: Negative  Patient is not pregnant [DL]   2328 CBC & Differential(!)  I reviewed the CBC.  White blood cell count is normal at 8.8.  Hemoglobin is 13.8 and platelet is 362 [DL]   Sat Dec 28, 2024   0014 HS Troponin T: <6  Less likely to be ACS or cardiac demand or myocarditis. [DL]   0014 Comprehensive Metabolic Panel(!)  CMP was reviewed by me.  Patient with glucose of 93, creatinine 0.7.  Sodium 133, potassium 3.7, bicarb of 25, calcium 9.3, ALT 11, AST 11, total bilirubin 0.2. [DL]      ED Course User Index  [DL] Lopez Naranjo MD                                           Medical Decision Making  This is a 39-year-old female patient, history of thyroid disease, ADHD on medications, who came to the emergency room for palpitations.  She said that this has happened to her before and did very.  In fact, she did have a Zio patch and she was told that she had  PVCs.  She has not follow-up with cardiology.  She moved to [facility] in June and has not follow-up with anyone.    She  states that she does not drink a lot of caffeine.  She does take ADHD medication but her symptoms started before that.  She denies any chest pain, shortness of breath.  She has no nausea, vomiting, abdominal pain, diarrhea.  No headache, focal weakness.    On physical exam, the patient is well-appearing.  She is under no acute distress.  Reading a book in the [place].  She has normal heart sound lung sounds.  She has normal radial pulses.  Abdomen is soft and nontender.  No guarding or distention.  No CVA tenderness.  She is awake and alert x 4, conversing appropriately.  Normal gait in the ED.    On telemetry, the patient has occasional PVCs.  Not a lot.    Her vital signs is reassuring with no tachycardia.  No tachypnea.  No hypotension or hypertension.  No fever.  Oxygen is 100% on room air.    Assessment: The patient with palpitations, EKG and telemetry called PVCs.  This is also consistent with her Zio patch reportedly with PVCs in [place].  She has no chest pain or shortness of breath.  EKG is nonischemic.  I did consider ACS and obtained troponins.  2 troponins are negative.  Her heart score is 0.    I did also consider dehydration, electrolyte abnormalities, LESLIE has obtained her labs and provided her IV fluid.    She also has history of thyroid disorder.  However, she is not tachycardic, she is not having proptosis of her eyes, no swelling.  I doubt hypothyroidism or thyroid storm.    She also has a negative PERC score so I doubt PE.    Workup: CBC, CMP, EKG, chest x-ray, troponin    Problems Addressed:  Palpitations: complicated acute illness or injury  PVC (premature ventricular contraction): complicated acute illness or injury    Amount and/or Complexity of Data Reviewed  Labs: ordered. Decision-making details documented in ED Course.  ECG/medicine tests: ordered. Decision-making details documented in ED Course.    Risk  Prescription drug management.    Updates: The patient with no abnormal findings on  objective workup.  I referred her to cardiology for evaluation of her palpitations.  She wanted a referral to general surgery because when she was in [place], she was told that she had gallstones.  She did not have any abdominal tenderness on my exam.  She did not come to the ED today for abdominal pain.  I did not obtain an ultrasound or workup for gallstones.  Her LFTs normal.  She has no fever.  I do not suspect that she has cholecystitis.  I referred her to general surgery as requested by the patient.  I did tell the patient about home care instructions and return precautions and she voices understanding and agreement to this plan.  The patient was discharged ambulatory home.    Please note that portions of this note were completed with a voice recognition program.         Final diagnoses:   Palpitations   PVC (premature ventricular contraction)       ED Disposition  ED Disposition       ED Disposition   Discharge    Condition   Stable    Comment   --               [place] DO  [address]  [facility] [address]  [phone]    In 1 day      [facility]  [address] [address]    If symptoms worsen    [facility]  [address]  [facility] [id number] [phone]  In 1 week      [facility] [place]  [address] [id number] [phone]  In 1 week           Medication List      No changes were made to your prescriptions during this visit.

## 2024-12-28 NOTE — DISCHARGE INSTRUCTIONS
Thank you for your visit to the Emergency Department.     It was a pleasure to take care of you in the emergency room today.     Today you were seen for palpitations. We performed a thorough history and physical exam.  We obtain your lab test, including EKG, troponin test for the heart.  There are no abnormal findings on the lab test.  However, we did see that you have some PVCs on the monitor and on EKG.  This is likely the reason why you were experiencing the palpitations.  We referred you to cardiology as requested as well as surgery as you requested for your gallbladder stones.      Please return to the nearest ED or call 911 if you experience:    Worsening symptoms, severe pain, difficulty breathing, chest pain, fever that doesn't break with Tylenol or Motrin, uncontrolled vomiting or diarrhea that doesn't stop, passing out, lethargy (drowsiness, hard to wake up), numbness/tingling/weakness on one side, speech difficulty, cannot walk, or any concerns for limb/life threatening issues.    The Emergency Department is open 24 hours a day.     Please follow up with: your primary care doctor within 1-3 days.    In the meantime, please:  Take acetaminophen 650mg every 6-8 hours and/or ibuprofen 600mg every 6-8 hours on a full stomach as needed for pain or fever.   Continue to take any other existing medications as prescribed.

## 2024-12-28 NOTE — ED NOTES
"PT CAME OUT OF HER ROOM AND STATED \"I'M LEAVING.\" DR. LR STATED THAT THEY NEEDED PAPERWORK AND TO WAIT JUST A MINUTE TO RECEIVE. HE ALSO INQUIRED ABOUT WHERE HER IV WENT. SHE STATED \"I USED TO PUT THEM IN SO I JUST TOOK IT OUT.\" THIS RN WENT TO PRINT D/C PAPERWORK AND HAND TO PT. PT DEPARTED WITH MALE  WITHOUT RECEIVING D/C PAPERWORK.   "

## 2025-01-21 DIAGNOSIS — F90.2 ATTENTION DEFICIT HYPERACTIVITY DISORDER (ADHD), COMBINED TYPE: ICD-10-CM

## 2025-01-22 RX ORDER — LISDEXAMFETAMINE DIMESYLATE 50 MG/1
50 CAPSULE ORAL EVERY MORNING
Qty: 30 CAPSULE | Refills: 0 | Status: SHIPPED | OUTPATIENT
Start: 2025-01-22

## 2025-01-23 RX ORDER — PROPRANOLOL HYDROCHLORIDE 10 MG/1
20 TABLET ORAL NIGHTLY
Qty: 60 TABLET | Refills: 3 | OUTPATIENT
Start: 2025-01-23

## 2025-01-24 ENCOUNTER — OFFICE VISIT (OUTPATIENT)
Dept: FAMILY MEDICINE CLINIC | Facility: CLINIC | Age: 40
End: 2025-01-24
Payer: OTHER GOVERNMENT

## 2025-01-24 VITALS
OXYGEN SATURATION: 98 % | TEMPERATURE: 96.8 F | DIASTOLIC BLOOD PRESSURE: 80 MMHG | RESPIRATION RATE: 16 BRPM | HEIGHT: 65 IN | BODY MASS INDEX: 34.05 KG/M2 | HEART RATE: 78 BPM | SYSTOLIC BLOOD PRESSURE: 110 MMHG | WEIGHT: 204.4 LBS

## 2025-01-24 DIAGNOSIS — E06.3 HYPOTHYROIDISM DUE TO HASHIMOTO THYROIDITIS: ICD-10-CM

## 2025-01-24 DIAGNOSIS — Z80.3 FAMILY HISTORY OF BREAST CANCER: ICD-10-CM

## 2025-01-24 DIAGNOSIS — Z71.3 WEIGHT LOSS COUNSELING, ENCOUNTER FOR: Primary | ICD-10-CM

## 2025-01-24 DIAGNOSIS — E66.01 MORBID (SEVERE) OBESITY DUE TO EXCESS CALORIES: ICD-10-CM

## 2025-01-24 DIAGNOSIS — F90.9 ATTENTION DEFICIT HYPERACTIVITY DISORDER (ADHD), UNSPECIFIED ADHD TYPE: ICD-10-CM

## 2025-01-24 DIAGNOSIS — E78.00 HYPERCHOLESTEROLEMIA: ICD-10-CM

## 2025-01-24 PROCEDURE — 99214 OFFICE O/P EST MOD 30 MIN: CPT

## 2025-01-24 RX ORDER — PROPRANOLOL HYDROCHLORIDE 10 MG/1
20 TABLET ORAL NIGHTLY
Qty: 60 TABLET | Refills: 3 | Status: SHIPPED | OUTPATIENT
Start: 2025-01-24

## 2025-01-24 NOTE — PROGRESS NOTES
Chief Complaint  Med Refill    Subjective          Daiana Carrizales presents to [facility]  History of Present Illness  The patient presents for weight management, palpitations, hypothyroidism, hypercholesterolemia, and health maintenance.    She has expressed a desire to increase her dosage of Zepbound, which she has been tolerating well. She reports a decrease in bowel movements from 3 to 4 times daily to once daily since starting the medication. She has also implemented lifestyle changes, including abstaining from alcohol consumption and adopting a 14-hour fasting regimen. Despite these efforts, she has observed weight gain. Her typical diet includes peanut butter crackers and coffee for breakfast, 2 slices of thin crust pizza for lunch, and a Caesar salad with chips for dinner. She has been taking B12 supplements to combat fatigue.    She has been experiencing palpitations, which were evaluated with an EKG and ultrasound as part of her disability claim with the [employer]. The technician identified extra ventricular beats, pending confirmation from the radiologist. These episodes are alarming for her. She continues to take propranolol nightly, which she finds beneficial. During daytime episodes, she manages with deep breathing exercises and relaxation techniques.    She is currently on a low dose of levothyroxine at 50 mcg.    She was started on rosuvastatin 10 mg in November 2023 for elevated cholesterol levels and has been taking it daily. She has a scheduled appointment on 03/07/2024 for fasting blood work and a follow-up visit on 03/14/2024.    She had lumps in her breast that were found last year around April 2023, which were benign. She is supposed to just watch it and have a breast exam over here. She is interested in testing if she has those BRCA genes.    SOCIAL HISTORY  She has cut out alcohol, previously consuming a beer a night.    FAMILY HISTORY  Her brother had  "non-Hodgkin's lymphoma on his neck. Her mother had a pituitary gland tumor. Her sister had depression and  as a cause of that. Her grandmother had diabetes. Her uncles have high blood pressure and high cholesterol. She has no family history of heart attack.    MEDICATIONS  Current: Vyvanse, propranolol, levothyroxine, rosuvastatin, B12      Objective   Vital Signs:   /80 (BP Location: Left arm, Patient Position: Sitting, Cuff Size: Adult)   Pulse 78   Temp 96.8 °F (36 °C) (Temporal)   Resp 16   Ht 165.1 cm (65\")   Wt 92.7 kg (204 lb 6.4 oz)   SpO2 98%   BMI 34.01 kg/m²     Physical Exam   Physical Exam  Heart sounds normal.    Result Review :     Prior notes available in the EMR were reviewed from the last 3months.   Prior labwork available within the EMR within the last year was reviewed          Results  Imaging  Ultrasound of the heart showed extra ventricular beats.             Assessment and Plan      Assessment & Plan  Attention deficit hyperactivity disorder (ADHD), unspecified ADHD type           Hypothyroidism due to Hashimoto thyroiditis    Orders:    TSH; Future    T4, free; Future    Weight loss counseling, encounter for    Orders:    Tirzepatide-Weight Management (ZEPBOUND) 5 MG/0.5ML solution auto-injector; Inject 0.5 mL under the skin into the appropriate area as directed 1 (One) Time Per Week.    Morbid (severe) obesity due to excess calories  Patient's (Body mass index is 34.01 kg/m².) indicates that they are obese (BMI >30) with health conditions that include dyslipidemias . Weight is worsening. BMI  is above average; BMI management plan is completed. We discussed portion control and increasing exercise.     Orders:    Tirzepatide-Weight Management (ZEPBOUND) 5 MG/0.5ML solution auto-injector; Inject 0.5 mL under the skin into the appropriate area as directed 1 (One) Time Per Week.    Hypercholesterolemia       Orders:    Lipid panel; Future    Family history of breast " cancer    Orders:    BRCAAssure Comprehensive Test     Answers submitted by the patient for this visit:  Weight Management (Submitted on 1/22/2025)  Chief Complaint: Weight Management  Weight: unchanged  Weight loss treatment: portion control, increasing exercise, decreasing alcohol consumption  Eating habit changes: Eating smaller portions  Energy level: decreased  Physical activity tolerance: stable  Treatment barriers: adherence to exercise, adherence to diet, ran out of medications  -Advised patient to report severe abdominal pain as Zepbound can cause pancreatitis or gallbladder disease.   -Counseled patient on strategies to limit nausea with Zepbound:    *Eat bland, low-fat foods, like crackers, toast, and rice   *Eat foods that contain water, like soups and gelatin   *Avoid lying down after they eat  -Educated patient on proper injection location.   -Will begin Zebound titration. Patient to increase each week as long as side effects are tolerable.   Increase to 5 mg once weekly.    -Will follow up in 3-4 months for weight check. Plan to discontinue if at least 5% of baseline body weight loss has not been achieved at that time.     Assessment & Plan  1. Weight management.  Her weight has increased, which may be attributed to hypothyroidism. She is currently on a low dose of levothyroxine. She is advised to incorporate more fruits and vegetables into her diet and consider taking a multivitamin supplement. The dosage of Zepbound will be increased. A TSH test will be ordered to ensure it is within the normal range.    2. Palpitations.  Her heart sounds are normal, and there is no immediate concern regarding the palpitations. She will continue her current regimen of propranolol, which will be refilled at [facility].    3. Hypothyroidism.  A TSH test will be ordered to ensure it is within the normal range. She is currently on a low dose of levothyroxine.    4. Hypercholesterolemia.  A lipid panel will be ordered to  monitor her cholesterol levels. She is currently taking rosuvastatin 10 mg daily.    5. Health maintenance.  A BRCA genetic test will be ordered due to her family history of breast cancer.      The above conditions are being actively managed, and applied to the decision making of this visit. Each was reviewed and are controlled, except for the changes noted above   The patient has multiple chronic illness as stated above  The prior available notes were reviewed   Lab results were reviewed  New orders as above, which may involve side effects related to management  Prescription drug management as above      Follow Up   No follow-ups on file.    Patient or patient representative verbalized consent for the use of Ambient Listening during the visit with  Pancho Rodriguez DO for chart documentation. 1/24/2025  08:49 EST    Patient was given instructions and counseling regarding her condition or for health maintenance advice. Please see specific information pulled into the AVS if appropriate.

## 2025-01-24 NOTE — ASSESSMENT & PLAN NOTE
Patient's (Body mass index is 34.01 kg/m².) indicates that they are obese (BMI >30) with health conditions that include dyslipidemias . Weight is worsening. BMI  is above average; BMI management plan is completed. We discussed portion control and increasing exercise.     Orders:    Tirzepatide-Weight Management (ZEPBOUND) 5 MG/0.5ML solution auto-injector; Inject 0.5 mL under the skin into the appropriate area as directed 1 (One) Time Per Week.

## 2025-01-30 DIAGNOSIS — F90.2 ATTENTION DEFICIT HYPERACTIVITY DISORDER (ADHD), COMBINED TYPE: ICD-10-CM

## 2025-01-30 RX ORDER — LISDEXAMFETAMINE DIMESYLATE 50 MG/1
50 CAPSULE ORAL EVERY MORNING
Qty: 30 CAPSULE | Refills: 0 | OUTPATIENT
Start: 2025-01-30

## 2025-02-11 LAB
BRCA1+BRCA2 MUT ANL BLD/T: NORMAL
CHOLEST SERPL-MCNC: 146 MG/DL (ref 100–199)
CITATION REF LAB TEST: NORMAL
CLINICAL INFO: NORMAL
HDLC SERPL-MCNC: 55 MG/DL
IMP & REVIEW OF LAB RESULTS: NORMAL
LAB DIRECTOR NAME PROVIDER: NORMAL
LDLC SERPL CALC-MCNC: 77 MG/DL (ref 0–99)
LDLC/HDLC SERPL: 1.4 RATIO (ref 0–3.2)
PREAUTHORIZATION: NORMAL
REASON FOR REFERRAL (NARRATIVE): NORMAL
RECOMMENDATION PATIENT DOC-IMP: NORMAL
REF LAB TEST METHOD: NORMAL
SPECIMEN SOURCE: NORMAL
T4 FREE SERPL-MCNC: 1.64 NG/DL (ref 0.82–1.77)
TRIGL SERPL-MCNC: 70 MG/DL (ref 0–149)
TSH SERPL DL<=0.005 MIU/L-ACNC: 1.51 UIU/ML (ref 0.45–4.5)
VLDLC SERPL CALC-MCNC: 14 MG/DL (ref 5–40)

## 2025-02-12 DIAGNOSIS — F90.9 ATTENTION DEFICIT HYPERACTIVITY DISORDER (ADHD), UNSPECIFIED ADHD TYPE: ICD-10-CM

## 2025-02-12 RX ORDER — LISDEXAMFETAMINE DIMESYLATE 50 MG/1
50 CAPSULE ORAL EVERY MORNING
Qty: 30 CAPSULE | Refills: 0 | Status: SHIPPED | OUTPATIENT
Start: 2025-02-12

## 2025-02-20 DIAGNOSIS — E66.01 MORBID (SEVERE) OBESITY DUE TO EXCESS CALORIES: Primary | ICD-10-CM

## 2025-03-07 RX ORDER — LEVOTHYROXINE SODIUM 50 UG/1
50 TABLET ORAL DAILY
Qty: 30 TABLET | Refills: 0 | Status: SHIPPED | OUTPATIENT
Start: 2025-03-07

## 2025-03-10 ENCOUNTER — TELEPHONE (OUTPATIENT)
Dept: FAMILY MEDICINE CLINIC | Facility: CLINIC | Age: 40
End: 2025-03-10

## 2025-03-10 NOTE — TELEPHONE ENCOUNTER
Caller: LABCOR    Relationship: ALIYA Muniz call back number:     [phone]        What was the call regarding:    THE FOLLOWING LAB TEST WAS ORDERED ON 1/26/25.  THIS TEST HAS BEEN RUN ON 1/24/25 AND THE RESULTS ARE IN EPIC.  LABC[facility] IS CANCELING THE TEST ORDERED ON 1/26/25.  PLEASE ADVISE

## 2025-03-11 LAB
CHOLEST SERPL-MCNC: 162 MG/DL (ref 0–200)
HDLC SERPL-MCNC: 46 MG/DL (ref 40–60)
LDLC SERPL CALC-MCNC: 101 MG/DL (ref 0–100)
REQUEST PROBLEM: NORMAL
T4 FREE SERPL-MCNC: 1.62 NG/DL (ref 0.92–1.68)
TRIGL SERPL-MCNC: 79 MG/DL (ref 0–150)
TSH SERPL DL<=0.005 MIU/L-ACNC: 1.11 UIU/ML (ref 0.27–4.2)
VLDLC SERPL CALC-MCNC: 15 MG/DL (ref 5–40)

## 2025-03-14 ENCOUNTER — OFFICE VISIT (OUTPATIENT)
Dept: FAMILY MEDICINE CLINIC | Facility: CLINIC | Age: 40
End: 2025-03-14
Payer: OTHER GOVERNMENT

## 2025-03-14 VITALS
WEIGHT: 191.2 LBS | TEMPERATURE: 96.8 F | HEART RATE: 83 BPM | RESPIRATION RATE: 16 BRPM | OXYGEN SATURATION: 99 % | BODY MASS INDEX: 31.86 KG/M2 | SYSTOLIC BLOOD PRESSURE: 118 MMHG | DIASTOLIC BLOOD PRESSURE: 80 MMHG | HEIGHT: 65 IN

## 2025-03-14 DIAGNOSIS — E78.00 HYPERCHOLESTEROLEMIA: Primary | ICD-10-CM

## 2025-03-14 DIAGNOSIS — E06.3 HYPOTHYROIDISM DUE TO HASHIMOTO THYROIDITIS: ICD-10-CM

## 2025-03-14 DIAGNOSIS — F90.2 ATTENTION DEFICIT HYPERACTIVITY DISORDER (ADHD), COMBINED TYPE: ICD-10-CM

## 2025-03-14 DIAGNOSIS — E66.01 MORBID (SEVERE) OBESITY DUE TO EXCESS CALORIES: ICD-10-CM

## 2025-03-14 DIAGNOSIS — L65.9 HAIR LOSS: ICD-10-CM

## 2025-03-14 DIAGNOSIS — M79.602 PAIN OF LEFT UPPER EXTREMITY: ICD-10-CM

## 2025-03-14 DIAGNOSIS — Z71.3 WEIGHT LOSS COUNSELING, ENCOUNTER FOR: ICD-10-CM

## 2025-03-14 RX ORDER — MINOXIDIL 50 MG/G
1 AEROSOL, FOAM TOPICAL DAILY
Qty: 60 G | Refills: 2 | Status: SHIPPED | OUTPATIENT
Start: 2025-03-14

## 2025-03-14 NOTE — ASSESSMENT & PLAN NOTE
Patient's (Body mass index is 31.82 kg/m².) indicates that they are obese (BMI >30) with health conditions that include dyslipidemias . Weight is improving with treatment. BMI  is above average; no BMI management plan is appropriate. We discussed low calorie, low carb based diet program, portion control, and increasing exercise.     Orders:    Tirzepatide 10 MG/0.5ML solution auto-injector; Inject 10 mg under the skin into the appropriate area as directed 1 (One) Time Per Week.    Minoxidil (Minoxidil for Men) 5 % foam; Apply 1 Application topically Daily.    Lipid panel; Future

## 2025-03-14 NOTE — ASSESSMENT & PLAN NOTE
Start minoxidil topical   Orders:    Tirzepatide 10 MG/0.5ML solution auto-injector; Inject 10 mg under the skin into the appropriate area as directed 1 (One) Time Per Week.    Minoxidil (Minoxidil for Men) 5 % foam; Apply 1 Application topically Daily.    Lipid panel; Future

## 2025-03-14 NOTE — ASSESSMENT & PLAN NOTE
Continue vyvanse   Orders:    Tirzepatide 10 MG/0.5ML solution auto-injector; Inject 10 mg under the skin into the appropriate area as directed 1 (One) Time Per Week.    Minoxidil (Minoxidil for Men) 5 % foam; Apply 1 Application topically Daily.    Lipid panel; Future

## 2025-03-14 NOTE — ASSESSMENT & PLAN NOTE
Pt will maintain statin compliance   Orders:    Tirzepatide 10 MG/0.5ML solution auto-injector; Inject 10 mg under the skin into the appropriate area as directed 1 (One) Time Per Week.    Minoxidil (Minoxidil for Men) 5 % foam; Apply 1 Application topically Daily.    Lipid panel; Future

## 2025-03-14 NOTE — ASSESSMENT & PLAN NOTE
Continue snythroid 50mcg   Orders:    Tirzepatide 10 MG/0.5ML solution auto-injector; Inject 10 mg under the skin into the appropriate area as directed 1 (One) Time Per Week.    Minoxidil (Minoxidil for Men) 5 % foam; Apply 1 Application topically Daily.    Lipid panel; Future

## 2025-03-14 NOTE — PROGRESS NOTES
"  Chief Complaint  Primary Care Follow-Up    Subjective          Daiana Carrizales presents to [facility]  History of Present Illness    39 y.o. female with hypothyroidism, ADHD, palpitations, pure hypercholesterolemia with a notably elevated LDL at 166.  She has a Mirena IUD.     PRESENTS FOR FOLLOW UP.  Filled out PA for BRCA testing      Objective   Vital Signs:   /80 (BP Location: Left arm, Patient Position: Sitting, Cuff Size: Adult)   Pulse 83   Temp 96.8 °F (36 °C) (Temporal)   Resp 16   Ht 165.1 cm (65\")   Wt 86.7 kg (191 lb 3.2 oz)   SpO2 99%   BMI 31.82 kg/m²     Physical Exam   Result Review :     Prior notes available in the EMR were reviewed from the last 3months.   Prior labwork available within the EMR within the last year was reviewed                   Assessment and Plan    Assessment & Plan  Hypercholesterolemia     Pt will maintain statin compliance   Orders:    Tirzepatide 10 MG/0.5ML solution auto-injector; Inject 10 mg under the skin into the appropriate area as directed 1 (One) Time Per Week.    Minoxidil (Minoxidil for Men) 5 % foam; Apply 1 Application topically Daily.    Lipid panel; Future    Hypothyroidism due to Hashimoto thyroiditis  Continue snythroid 50mcg   Orders:    Tirzepatide 10 MG/0.5ML solution auto-injector; Inject 10 mg under the skin into the appropriate area as directed 1 (One) Time Per Week.    Minoxidil (Minoxidil for Men) 5 % foam; Apply 1 Application topically Daily.    Lipid panel; Future    Attention deficit hyperactivity disorder (ADHD), combined type    Continue vyvanse   Orders:    Tirzepatide 10 MG/0.5ML solution auto-injector; Inject 10 mg under the skin into the appropriate area as directed 1 (One) Time Per Week.    Minoxidil (Minoxidil for Men) 5 % foam; Apply 1 Application topically Daily.    Lipid panel; Future    Pain of left upper extremity  Recommended stertching, PT if she would like   Orders:    " Tirzepatide 10 MG/0.5ML solution auto-injector; Inject 10 mg under the skin into the appropriate area as directed 1 (One) Time Per Week.    Minoxidil (Minoxidil for Men) 5 % foam; Apply 1 Application topically Daily.    Lipid panel; Future    Hair loss  Start minoxidil topical   Orders:    Tirzepatide 10 MG/0.5ML solution auto-injector; Inject 10 mg under the skin into the appropriate area as directed 1 (One) Time Per Week.    Minoxidil (Minoxidil for Men) 5 % foam; Apply 1 Application topically Daily.    Lipid panel; Future    Morbid (severe) obesity due to excess calories  Patient's (Body mass index is 31.82 kg/m².) indicates that they are obese (BMI >30) with health conditions that include dyslipidemias . Weight is improving with treatment. BMI  is above average; no BMI management plan is appropriate. We discussed low calorie, low carb based diet program, portion control, and increasing exercise.     Orders:    Tirzepatide 10 MG/0.5ML solution auto-injector; Inject 10 mg under the skin into the appropriate area as directed 1 (One) Time Per Week.    Minoxidil (Minoxidil for Men) 5 % foam; Apply 1 Application topically Daily.    Lipid panel; Future    Weight loss counseling, encounter for  Increased trizepatide to 10mg   Orders:    Tirzepatide 10 MG/0.5ML solution auto-injector; Inject 10 mg under the skin into the appropriate area as directed 1 (One) Time Per Week.    Minoxidil (Minoxidil for Men) 5 % foam; Apply 1 Application topically Daily.    Lipid panel; Future           The above conditions are being actively managed, and applied to the decision making of this visit. Each was reviewed and are controlled, except for the changes noted above   The patient has multiple chronic illness as stated above  The prior available notes were reviewed   Lab results were reviewed  New orders as above, which may involve side effects related to management  Prescription drug management as above      Follow Up   No follow-ups  on file.  Patient was given instructions and counseling regarding her condition or for health maintenance advice. Please see specific information pulled into the AVS if appropriate.

## 2025-03-27 DIAGNOSIS — E66.01 MORBID (SEVERE) OBESITY DUE TO EXCESS CALORIES: ICD-10-CM

## 2025-03-27 DIAGNOSIS — E03.9 ACQUIRED HYPOTHYROIDISM: Primary | ICD-10-CM

## 2025-03-27 DIAGNOSIS — E78.00 HYPERCHOLESTEROLEMIA: ICD-10-CM

## 2025-03-27 RX ORDER — LEVOTHYROXINE SODIUM 50 UG/1
50 TABLET ORAL DAILY
Qty: 30 TABLET | Refills: 0 | OUTPATIENT
Start: 2025-03-27

## 2025-03-27 RX ORDER — LEVOTHYROXINE SODIUM 50 UG/1
50 TABLET ORAL DAILY
Qty: 90 TABLET | Refills: 2 | Status: SHIPPED | OUTPATIENT
Start: 2025-03-27

## 2025-05-12 ENCOUNTER — OFFICE VISIT (OUTPATIENT)
Dept: OBSTETRICS AND GYNECOLOGY | Facility: CLINIC | Age: 40
End: 2025-05-12
Payer: OTHER GOVERNMENT

## 2025-05-12 VITALS
BODY MASS INDEX: 30.7 KG/M2 | SYSTOLIC BLOOD PRESSURE: 122 MMHG | WEIGHT: 184.25 LBS | HEIGHT: 65 IN | DIASTOLIC BLOOD PRESSURE: 82 MMHG

## 2025-05-12 DIAGNOSIS — Z12.31 SCREENING MAMMOGRAM FOR BREAST CANCER: ICD-10-CM

## 2025-05-12 DIAGNOSIS — R10.2 PELVIC PAIN: Primary | ICD-10-CM

## 2025-05-12 DIAGNOSIS — Z13.89 SCREENING FOR GENITOURINARY CONDITION: ICD-10-CM

## 2025-05-12 LAB
B-HCG UR QL: NEGATIVE
BILIRUB BLD-MCNC: NEGATIVE MG/DL
CLARITY, POC: CLEAR
COLOR UR: YELLOW
EXPIRATION DATE: NORMAL
GLUCOSE UR STRIP-MCNC: NEGATIVE MG/DL
INTERNAL NEGATIVE CONTROL: NORMAL
INTERNAL POSITIVE CONTROL: NORMAL
KETONES UR QL: NEGATIVE
LEUKOCYTE EST, POC: ABNORMAL
Lab: NORMAL
NITRITE UR-MCNC: NEGATIVE MG/ML
PH UR: 5 [PH] (ref 5–8)
PROT UR STRIP-MCNC: NEGATIVE MG/DL
RBC # UR STRIP: ABNORMAL /UL
SP GR UR: 1.01 (ref 1–1.03)
UROBILINOGEN UR QL: ABNORMAL

## 2025-05-12 NOTE — PROGRESS NOTES
Problem: Bariatric Environmental Safety  Goal: Safety Maintained with Care  Outcome: Ongoing, Progressing     Problem: Physical Therapy Goal  Goal: Physical Therapy Goal  Description: Goals to be met by: 20    Patient will increase functional independence with mobility by performin. Supine to sit with min A.   2. Sit to supine with min A. MET   3. Rolling R and L with min A.   4. Sitting at edge of bed >5 minutes with min A. MET   5. PT will assess sit<>stand. MET   6. Sit<>stand with RW with modA x1 person.  7. Gait x15 ft with RW and CGA.  Outcome: Ongoing, Progressing   Pt sup to sit SBA with HOB partially up and pt using bedrail, sit to stand min.A x 2 with BRW, stood and marched in place, side steps, and steps fw/bk with min.A x 2. Recommend cont PT in SNF   "Subjective     Chief Complaint   Patient presents with    Pelvic Pain       Daiana Carrizales is a 39 y.o. No obstetric history on file. whose LMP is Patient's last menstrual period was 2025 (exact date).. She presents with C/O PELVIC PAIN. She has a history of pelvic pain. She was seen in this office in  with c/o pelvic pain. Her IUD was replaced at this time. Starting in April she developed pelvic pain. The pain has been intense at times. She states, \"I am taking so much tylenol and Ibuprofen that my liver and kidney hates me.\" She has tried heat. The pain has lessened with intensity but does flare at times. This past weekend she states the pain was so intense that she was crying. The lower pain ranges from 'a very consistent dull pain that is enough to put you in a bad mood and then on Saturday it reached a peak that made me want to go to the ER.\" Then last night she developed upper quadrant pain that was very stabbing. She states, \"I am going to overdose on OTC medication.\"  She is on Zepbound. She reports having a bowel movement every day or every other day. When she has lower pelvic pain, her bowel  movements are painful. Sex is not painful during but she has discomfort afterwards. She denies problems with urination. She had a history of pelvic pain last , she reports this pain is different.     She had a new Mirena IUD placed 10/24.  She denies changes in partners.     She has never had a dx lap.     She is on her cycle today. She is a .     HPI    HPI    The following portions of the patient's history were reviewed and updated as appropriate:vital signs, allergies, current medications, past medical history, past social history, past surgical history, and problem list      Review of Systems     Review of Systems   Constitutional: Negative.    Gastrointestinal: Negative.    Genitourinary:  Positive for pelvic pain.       Objective      /82   Ht 165.1 cm (65\")   Wt 83.6 kg (184 " lb 4 oz)   LMP 05/09/2025 (Exact Date)   Breastfeeding No   BMI 30.66 kg/m²     Physical Exam    Physical Exam  Constitutional:       Appearance: Normal appearance.   Abdominal:      General: There is no distension.      Palpations: Abdomen is soft.      Tenderness: There is abdominal tenderness in the epigastric area, periumbilical area and suprapubic area.   Musculoskeletal:         General: Normal range of motion.   Skin:     General: Skin is warm and dry.   Neurological:      General: No focal deficit present.      Mental Status: She is alert and oriented to person, place, and time.   Psychiatric:         Mood and Affect: Mood normal.         Behavior: Behavior normal.         Lab Review   Labs: Urine pregnancy test, Urinalysis - with micro     Imaging   Ultrasound - Pelvic Vaginal Uterus AV, normal shape and size. IUD seen in endometrium. EL 0.9cm. Cyst with internal echos on (R) ovary measuring 2.9 x 2.9cm. Cyst with internal echos on (L) ovary seen measuring 3.4 x 3.1cm.     Assessment/Plan     Bilateral ovarian cyst- Appear hemorrhagic. Offered OCPs in addition to IUD for ovarian suppression. Rec rotation of tylenol and motrin, states she is already doing so.   Needs AE - Screening MMG ordered today. Needs pap.   Persistent pelvic pain- Consider Dx Lap. Also consider endometrial ablation as she is using IUD for period management. Her partner has a vasectomy. Dr. Pierce into exam room to disc. Plan of care- DX lap and endometrial biopsy. Plan to have surgery with general surgery as well. Thinking July d/t her school program.     RTO for preop consult     Rosita Torres, PEGGY  5/12/2025

## 2025-05-15 PROBLEM — R10.2 PELVIC PAIN: Status: ACTIVE | Noted: 2025-05-15
